# Patient Record
Sex: FEMALE | Employment: OTHER | URBAN - NONMETROPOLITAN AREA
[De-identification: names, ages, dates, MRNs, and addresses within clinical notes are randomized per-mention and may not be internally consistent; named-entity substitution may affect disease eponyms.]

---

## 2021-01-17 PROBLEM — I48.91 ATRIAL FIBRILLATION (HCC): Status: ACTIVE | Noted: 2021-01-17

## 2021-01-17 PROBLEM — U07.1 COVID-19: Status: ACTIVE | Noted: 2021-01-11

## 2021-01-17 PROBLEM — R62.7 ADULT FAILURE TO THRIVE: Status: ACTIVE | Noted: 2021-01-17

## 2021-01-17 PROBLEM — L85.8 CUTANEOUS HORN: Status: ACTIVE | Noted: 2021-01-15

## 2021-01-17 PROBLEM — I16.0 HYPERTENSIVE URGENCY: Status: ACTIVE | Noted: 2021-01-17

## 2021-01-17 RX ORDER — LISINOPRIL 40 MG/1
40 TABLET ORAL
COMMUNITY
Start: 2021-01-16 | End: 2021-02-15

## 2021-01-17 RX ORDER — B-COMPLEX WITH VITAMIN C
1 TABLET ORAL 2 TIMES DAILY WITH MEALS
COMMUNITY

## 2021-01-17 RX ORDER — HYDRALAZINE HYDROCHLORIDE 25 MG/1
25 TABLET, FILM COATED ORAL EVERY 6 HOURS PRN
COMMUNITY
Start: 2021-01-15 | End: 2021-02-14

## 2021-01-17 RX ORDER — ASCORBIC ACID 500 MG
500 TABLET ORAL DAILY
COMMUNITY

## 2021-01-17 SDOH — HEALTH STABILITY: MENTAL HEALTH: HOW OFTEN DO YOU HAVE A DRINK CONTAINING ALCOHOL?: NOT ASKED

## 2021-01-17 SDOH — HEALTH STABILITY: MENTAL HEALTH: HOW MANY STANDARD DRINKS CONTAINING ALCOHOL DO YOU HAVE ON A TYPICAL DAY?: NOT ASKED

## 2021-01-17 NOTE — PROGRESS NOTES
H&P (Admission H&P at [facility])      NAME: Jasiel Easton  DATE: 21  ROOM #: 30-1  CODE STATUS: FULL CODE  REASON FOR ADMISSION: Weakness after COVID  : 1928  ADMISSION DATE: 2021  SKILLED PATIENT: Yes    History obtained from chart review, the patient and nursing staff. SUBJECTIVE:  HPI: Jasiel Easton is a 80 y.o. female. Pt seen and examined at bedside. Patient admitted to [facility] from 1/10 to  for issues as noted below. D/c summary from hospital team:  [facility] Course:     Jasiel Easton is a 80 y.o. female patient of Physician No with a history of arrythmia presenting from home with c/o malaise. Patient reports she has not been feeling well since last month, with poor appetite and fatigue. She reports at baseline she mows her own 4-acre lawn but recently has been unable to do so. Of note, patient lived with her nephew, who came to ED before patient and  of a cardiac arrest, and had become dependent on him. She also reported recent pain in her right ear with bloody discharge, denies any pain or discharge today. She reported headache earlier today and reports hard of hearing. Patient denies cough, SOB, or chest pain. Covid-positive in ED. Reports being able to ambulate with walker in room. Discharge Diagnoses:  * COVID-19  Assessment & Plan  Pt reports respiratory and constitutional symptoms >1 mo, first positive test 1/10/21  Continue special isolation, given unclear start of symptoms will recommend x 10 days from positive test  CBC w diff, CMP, Mag unremarkable. CRP downtrending, discontinued labs given pt poorly cooperative  CXR shows Minimal fluid and atelectasis in the lung bases. Diffuse chronic lung markings noted without well-defined nodule or infiltrate.   Patient tolerating room air prophylactic lovenox given high VTE risk. Pt refusing intermittently. Recommend 40 days of anticoagulation for Covid, patient would be a good candidate for Xarelto or oral anticoagulation given afib but was refusing medication. Provider will need to discuss with patient and POA on whether to start a longterm medication as patient is more amenable over time. Cutaneous horn  Assessment & Plan  Present on R hand over 2nd MCP  Will defer management to outpatient    Adult failure to thrive  Assessment & Plan  Likely secondary to recent Covid of unclear duration. Nutrition consulted. Documented weight loss likely spurious. Continue multivitamin, patient insistent on home supplements  Impaired ADLs and IADLs. Granddaughter and provider able to motivate patient to agree to [facility]/SNF for now. Placement pending. Hypertensive urgency  Assessment & Plan  Likely due to stress and recent bereavement, unclear prior history  Improved s/p clonidine and IV metoprolol in ED  Pt tolerating lisinopril now but was refusing previously, started this admission and increased to 40mg QD  will consider clonidine patch given PO refusal  Continue hydralazine for further urgency episodes with hold parameters    Atrial fibrillation [facility])  Assessment & Plan  Afib present on admission, unclear if present in 2011 given documented arrythmia, further documentation unavailable  CHADS2-VASc score 4: Stroke 4.8% per year 6.7% risk of stroke/TIA/systemic embolism. Spoke to [facility] regarding longterm anticoagulation, patient has fall risk and refusing most meds. No desire to go on comfort care at this time, will continue lovenox for now  Provider will need followup conversation on whether to start medication as patient has become more amenable over time. Increased risk due to recent covid. \"    Since admission:  Doing well since admission  Breathing at basline  Not on O2  Should be able to come off covid segundo soon Denies cough, wheezing or SOB. No fevers  Appetite has been better  wts steady  Acclimating well  Was having issues at home caring for herself as noted in d/c summary  No CP/SOB    As for her afib  Discussed [address] today, she's not sure about it. Not on andres agent, but no s/s tachy    BP's have been high on, 140-170/90s  On lisinopril 40mg. Has cutaneous horn on R hand, does not bother her. Allergies and Medications were reviewed through the [facility] EMR. All medications reviewed and reconciled, including OTC and herbal medications. Patient Active Problem List    Diagnosis Date Noted    Hypertensive urgency 01/17/2021    Atrial fibrillation ([address] 01/17/2021    Adult failure to thrive 01/17/2021    Cutaneous horn 01/15/2021    COVID-19 01/11/2021       Past Medical History:   Diagnosis Date    Atrial fibrillation ([address]     Hypertension, essential        History reviewed. No pertinent surgical history. No Known Allergies    Social History     Tobacco Use    Smoking status: Never Smoker    Smokeless tobacco: Never Used   Substance Use Topics    Alcohol use: Not Currently        Family History   Problem Relation Age of Onset    No Known Problems Mother     No Known Problems Father          I have reviewed the patient's past medical history, past surgical history, allergies, medications, social and family history and I have made updates where appropriate.       Review of Systems  Positive responses are highlighted in bold    Constitutional:  Fever, Chills, Night Sweats, Fatigue, Unexpected changes in weight  Eyes:  Eye discharge, Eye pain, Eye redness, Visual disturbances   HENT:  Ear pain, Tinnitus, Nosebleeds, Trouble swallowing, Hearing loss, Sore throat  Cardiovascular:  Chest Pain, Palpitations, Orthopnea, Paroxysmal Nocturnal Dyspnea  Respiratory:  Cough, Wheezing, Shortness of breath, Chest tightness, Apnea Gastrointestinal:  Nausea, Vomiting, Diarrhea, Constipation, Heartburn, Blood in stool  Genitourinary:  Difficulty or painful urination, Flank pain, Change in frequency, Urgency  Skin:  Color change, Rash, Itching, Wound  Psychiatric:  Hallucinations, Anxiety, Depression, Suicidal ideation  Hematological:  Enlarged glands, Easy bleeding, Easily bruising  Musculoskeletal:  Joint pain, Back pain, Gait problems, Joint swelling, Myalgias  Neurological:  Dizziness, Headaches, Presyncope, Numbness, Seizures, Tremors  Allergy:  Environmental allergies, Food allergies  Endocrine:  Heat Intolerance, Cold Intolerance, Polydipsia, Polyphagia, Polyuria      PHYSICAL EXAM:  Vitals:    01/20/21 1700   BP: (!) 154/80   Pulse: 92   Resp: 16   Temp: 97.3 °F (36.3 °C)   Weight: 174 lb 9.6 oz (79.2 kg)   Height: 5' 2\" (1.575 m)     Body mass index is 31.93 kg/m². Pain Score:   0 - No pain    VS Reviewed  General Appearance: well developed and well- nourished, in no acute distress  Head: normocephalic and atraumatic  Eyes: pupils equal, round, and reactive to light, conjunctivae and eye lids without erythema  ENT: external ear and ear canal normal bilaterally, nose without deformity, nasal mucosa and turbinates normal without polyps, oropharynx normal, dentition is normal for age, no lip or gum lesions noted  Neck: supple and non-tender without mass, no thyromegaly or thyroid nodules, no cervical lymphadenopathy  Pulmonary/Chest: clear to auscultation bilaterally- no wheezes, rales or rhonchi, normal air movement, no respiratory distress or retractions  Cardiovascular: normal rate, regular rhythm, normal S1 and S2, no murmurs, rubs, clicks, or gallops, distal pulses intact  Abdomen: soft, non-tender, non-distended, bowel sounds physiologic,  no rebound or guarding, no masses or hernias noted. Liver and spleen without enlargement.    Extremities: no cyanosis, clubbing or edema of the lower extremities Musculoskeletal: No joint swelling or gross deformity   Neuro:  Alert, 5/5 strength globally and symmetrically, 2+ patellar reflexes b/l,  normal speech, no focal findings or movement disorder noted  Psych:  Normal affect without evidence of depression or anxiety, insight and judgement are appropriate, memory appears intact today  Skin: warm and dry, no rash or erythema.  Cutaneous horn on dorsum R hand  Lymph:  No cervical, auricular or supraclavicular lymph nodes palpated      LABS/IMAGING    Comprehensive Metabolic Panel (21/51/7625 6:43 AM EST)  Comprehensive Metabolic Panel (21/93/7298 6:43 AM EST)   Component Value Ref Range Performed At [facility]   Sodium 141 135 - 145 mmol/L [facility]     Potassium 3.8 3.5 - 5.1 mmol/L [facility]     Chloride 109 98 - 111 mmol/L [facility]     Bicarbonate 28 21 - 32 mmol/L [facility]     Anion Gap 8 (L) 10 - 20 mmol/L [facility]     Glucose 112 (H) 65 - 99 mg/dL [facility]     BUN 14 8 - 25 mg/dL [facility]     Creatinine 0.60 0.60 - 1.30 mg/dL [facility]     eGFR 79 >=60 mL/min/1.73 m2 [facility]     BUN/Creatinine Ratio 23.3 (H) 10.0 - 20.0 [facility]     Total Protein 6.7 6.0 - 8.0 g/dL [facility]     Albumin 3.1 (L) 3.2 - 5.2 g/dL [facility]     Calcium 9.9 8.4 - 10.2 mg/dL [facility]     Alkaline Phosphatase 69 40 - 150 U/L [facility]     AST 18 0 - 45 U/L [facility]     Total Bilirubin 0.4 0.0 - 1.3 mg/dL [facility]     ALT 18 14 - 65 U/L [facility]        CBC Auto Differential (01/12/2021 6:42 AM EST)  CBC Auto Differential (01/12/2021 6:42 AM EST)   Component Value Ref Range Performed At [facility]   WBC 4.84 4.50 - 11.00 K/mcL [facility]     RBC 4.77 4.00 - 5.20 M/mcL [facility]     Hemoglobin 14.2 12.0 - 16.0 g/dL [facility]     Hematocrit 43.1 36.0 - 46.0 % [facility]     MCV 90.4 80.0 - 100.0 fL [facility]     MCH 29.8 26.0 - 34.0 pg [facility]     MCHC 32.9 31.0 - 37.0 g/dL [facility]     Platelets 211 665 - 011 K/mcL [facility]     RDW - CV 13.4 11.6 - 14.8 % [facility]     MPV 9.2 (L) 9.4 - 12.4 fL [facility]   Neutrophils 57.7 % [address] [place] [address] LAB     Lymphocytes 31.8 % [facility]     Monocytes 8.5 % [facility]     Eosinophils 1.4 % [facility]     Basophils 0.2 % [facility]     IG Percent 0.40Comment: The IG parameter is the percentage of metamyelocytes, myelocytes and promyelocytes.  An immature granulocyte count (IG) of 1% or more suggests the possibility of infection, an IG count of 3% is very likely related to an infection. % [address] [place] [address] LAB     Neutrophils Abs 2.79 1.70 - 7.00 K/mcL [address] [place] [address] LAB     Lymphocytes Abs 1.54 0.90 - 4.00 K/mcL [address] [place] [address] LAB     Monocytes Abs 0.41 0.30 - 0.90 K/mcL [address] [place] [address] LAB     Eosinophils Abs 0.07 0.00 - 0.50 K/mcL [address] [place] [address] LAB     Basophils Abs 0.01 0.00 - 0.30 K/mcL [address] [place] [address] LAB     IG Absolute 0.02 0.00 - 0.30 K/mcL [address] [place] [address] LAB     Nucleated RBC 0.0 % [address] [place] [address] LAB     Nucleated RBC Abs 0.00 0.00 - 0.00 K/mcL [address] [place] [address] LAB        XR Chest 1 View (01/11/2021 8:06 PM EST)  XR Chest 1 View (01/11/2021 8:06 PM EST)   Specimen         XR Chest 1 View (01/11/2021 8:06 PM EST)   Impressions Performed At       1.  Mild stable cardiomegaly.        2.  Minimal fluid and atelectasis in the lung bases.  Diffuse chronic lung markings noted without well-defined nodule or infiltrate.                    Workstation ID:   [id number]   [email or web address]       XR Chest 1 View (01/11/2021 8:06 PM EST)   Narrative Performed At   EXAMINATION:    XR CHEST PA/AP  1/11/2021 8:06 pm        HISTORY:    ORDERING SYSTEM PROVIDED HISTORY:  Covid+,        TECHNOLOGIST PROVIDED HISTORY:    Illness/Other    Reason for exam: routine COVID +    Cancer History: [facility]    Surgery, RadiationHistory: [facility]    Encounter Type: Initial    Additional signs and symptoms: none        ORDERING SYSTEM PROVIDED DIAGNOSIS CODES:    R63.0 Anorexia    U07.1 COVID-19    J98.8 COVID-19            COMPARISON:    Portable chest from 01/11/2021.        FINDINGS:  Trachea is midline.  Mediastinum is not widened.  Heart size is mildly prominent.  There is moderate calcified plaquing of the aortic arch.  Small effusions with atelectasis are the lung bases.  Chronic lung markings are noted.  No well-defined infiltrate or nodule or pneumothorax is noted.        The bony elements show osteopenic degenerative changes. ASSESSMENT & PLAN  1. COVID-19    Doing well  Should be able to come off covid segundo soon  con't supportive care  lovenox for 42 days  Monitor for any changes  Seen in full PPE    2. Adult failure to thrive    Doing better with help here  Likely can't go back home to live alone  D/c planning on going    3. Hypertensive urgency    BP's high since admission  con't lisinopril 40mg  Add norvasc 5mg  F/u BP's next wk    4. Atrial fibrillation, unspecified type ([address]    Stable thus far  Appears in sinus today  Not on andres agent, doesn't appear to need  Pt didn't want to discuss [address] today  Will see if Hilda Norwood can f/u later this wk or next wk    5. Cutaneous horn    Will monitor this    6. Physical deconditioning    PT/OT      Disposition: FULL CODE. Con't PT/OT. Reassess 30 days, sooner prn.          Electronically signed by Chepe Johnston DO on 1/20/2021 at 5:19 PM

## 2021-01-20 ENCOUNTER — OUTSIDE SERVICES (OUTPATIENT)
Dept: FAMILY MEDICINE CLINIC | Age: 86
End: 2021-01-20
Payer: MEDICARE

## 2021-01-20 VITALS
DIASTOLIC BLOOD PRESSURE: 80 MMHG | SYSTOLIC BLOOD PRESSURE: 154 MMHG | WEIGHT: 174.6 LBS | TEMPERATURE: 97.3 F | BODY MASS INDEX: 32.13 KG/M2 | RESPIRATION RATE: 16 BRPM | HEART RATE: 92 BPM | HEIGHT: 62 IN

## 2021-01-20 DIAGNOSIS — I48.91 ATRIAL FIBRILLATION, UNSPECIFIED TYPE (HCC): ICD-10-CM

## 2021-01-20 DIAGNOSIS — R53.81 PHYSICAL DECONDITIONING: ICD-10-CM

## 2021-01-20 DIAGNOSIS — U07.1 COVID-19: Primary | ICD-10-CM

## 2021-01-20 DIAGNOSIS — L85.8 CUTANEOUS HORN: ICD-10-CM

## 2021-01-20 DIAGNOSIS — I16.0 HYPERTENSIVE URGENCY: ICD-10-CM

## 2021-01-20 DIAGNOSIS — R62.7 ADULT FAILURE TO THRIVE: ICD-10-CM

## 2021-01-20 PROCEDURE — 99305 1ST NF CARE MODERATE MDM 35: CPT | Performed by: FAMILY MEDICINE

## 2021-02-26 ENCOUNTER — TELEPHONE (OUTPATIENT)
Dept: FAMILY MEDICINE CLINIC | Age: 86
End: 2021-02-26

## 2021-02-26 NOTE — TELEPHONE ENCOUNTER
Spoke with Kathie at [facility], was given the of number [phone]. Pt cancelled her appointment for 3/2/21, she is wanting to \"doctor\" with a provider closer to home.

## 2021-02-26 NOTE — TELEPHONE ENCOUNTER
Needs previsit planning call    Please confirm with [facility] good contact info  Thanks!     Future Appointments   Date Time Provider Department Center   3/2/2021 12:40 PM Akash Pena, [address] [place] [address]

## 2021-02-26 NOTE — TELEPHONE ENCOUNTER
Spoke with pt, she stated she is not wanting to f/u with  and will find a provider closer to home. Pt is working with [employer] in [facility].

## 2023-06-02 ENCOUNTER — HOSPITAL ENCOUNTER (EMERGENCY)
Age: 88
Discharge: HOME | End: 2023-06-02
Payer: MEDICARE

## 2023-06-02 VITALS
SYSTOLIC BLOOD PRESSURE: 206 MMHG | OXYGEN SATURATION: 97 % | RESPIRATION RATE: 20 BRPM | DIASTOLIC BLOOD PRESSURE: 90 MMHG | TEMPERATURE: 98.8 F | HEART RATE: 61 BPM

## 2023-06-02 DIAGNOSIS — I48.20 CHRONIC ATRIAL FIBRILLATION (HCC): ICD-10-CM

## 2023-06-02 DIAGNOSIS — I10 UNCONTROLLED HYPERTENSION: Primary | ICD-10-CM

## 2023-06-02 LAB
EKG ATRIAL RATE: 79 BPM
EKG Q-T INTERVAL: 366 MS
EKG QRS DURATION: 88 MS
EKG QTC CALCULATION (BAZETT): 406 MS
EKG R AXIS: -53 DEGREES
EKG T AXIS: 37 DEGREES
EKG VENTRICULAR RATE: 74 BPM

## 2023-06-02 PROCEDURE — 93005 ELECTROCARDIOGRAM TRACING: CPT | Performed by: NURSE PRACTITIONER

## 2023-06-02 PROCEDURE — 99203 OFFICE O/P NEW LOW 30 MIN: CPT

## 2023-06-02 PROCEDURE — 6370000000 HC RX 637 (ALT 250 FOR IP): Performed by: NURSE PRACTITIONER

## 2023-06-02 PROCEDURE — 93010 ELECTROCARDIOGRAM REPORT: CPT | Performed by: NUCLEAR MEDICINE

## 2023-06-02 PROCEDURE — 99214 OFFICE O/P EST MOD 30 MIN: CPT | Performed by: NURSE PRACTITIONER

## 2023-06-02 RX ORDER — AMLODIPINE BESYLATE 5 MG/1
5 TABLET ORAL DAILY
Qty: 10 TABLET | Refills: 0 | Status: SHIPPED | OUTPATIENT
Start: 2023-06-02 | End: 2023-06-12

## 2023-06-02 RX ORDER — CLONIDINE HYDROCHLORIDE 0.1 MG/1
0.1 TABLET ORAL ONCE
Status: COMPLETED | OUTPATIENT
Start: 2023-06-02 | End: 2023-06-02

## 2023-06-02 RX ORDER — METOPROLOL SUCCINATE 25 MG/1
25 TABLET, EXTENDED RELEASE ORAL DAILY
COMMUNITY

## 2023-06-02 RX ORDER — LOSARTAN POTASSIUM 50 MG/1
50 TABLET ORAL DAILY
COMMUNITY

## 2023-06-02 RX ADMIN — CLONIDINE HYDROCHLORIDE 0.1 MG: 0.1 TABLET ORAL at 17:00

## 2023-06-02 ASSESSMENT — ENCOUNTER SYMPTOMS
CHEST TIGHTNESS: 0
SHORTNESS OF BREATH: 0
EYE PAIN: 0
NAUSEA: 0

## 2023-06-02 NOTE — ED PROVIDER NOTES
María Elena  Urgent Care Encounter       CHIEF COMPLAINT       Chief Complaint   Patient presents with    Hypertension     217/100 and 224/110 at [facility]       Nurses Notes reviewed and I agree except as noted in the HPI. HISTORY OF PRESENT ILLNESS   Drake Garcia is a 80 y.o. female who presents after being sent to urgent care by [facility] after she was found to have elevated ocular pressure. She was scheduled today for consultation prior to cataract surgery. Her blood pressure has been elevated at 217/100. Upon presentation to urgent care her blood pressure is 196/117. Prior history is significant for high blood pressure, hypertensive urgency, and chronic atrial fibrillation controlled rate without blood thinner usage. Patient does not currently have a PCP. She notes taking all natural herbal medicines. She does take losartan 50 mg and Toprol XL 25 mg daily for blood pressure. Denies any headaches, shortness of breath, dizziness, weakness, or chest pain. Patient is very hard of hearing and has poor vision. The history is provided by the patient, a relative and a caregiver. History limited by: hard of hearing. REVIEW OF SYSTEMS     Review of Systems   Constitutional:  Positive for fatigue. HENT:  Positive for hearing loss (chronic). Eyes:  Positive for visual disturbance (cataracts). Negative for pain. Respiratory:  Negative for chest tightness and shortness of breath. Cardiovascular:  Negative for chest pain and palpitations. Gastrointestinal:  Negative for nausea. Musculoskeletal:  Negative for myalgias. Neurological:  Negative for dizziness, weakness and headaches. Psychiatric/Behavioral:  Negative for confusion. PAST MEDICAL HISTORY         Diagnosis Date    Atrial fibrillation ([address]     Hypertension, essential        SURGICALHISTORY     Patient  has no past surgical history on file.     CURRENT MEDICATIONS       Previous Medications

## 2023-06-02 NOTE — ED TRIAGE NOTES
Patient to room via wheelchair from lobby and sister. Patient's friend also at bedside. Patient's friend states they went to eye doctor today to have cataracts removed and patient was anxious due to wait and procedure. States when blood pressure was assessed it was 217/100 and 224/110 and was sent to urgent care. Denies pain.

## 2023-06-02 NOTE — ED NOTES
Patient with sister and friend. Denies needs.  Will continue to monitor     Barbra Alatorre RN  06/02/23 [id number]

## 2024-03-14 ENCOUNTER — HOSPITAL ENCOUNTER (OUTPATIENT)
Dept: CT IMAGING | Age: 89
Discharge: HOME | End: 2024-03-14
Attending: RADIOLOGY

## 2024-03-14 DIAGNOSIS — R69 DIAGNOSIS UNKNOWN: ICD-10-CM
